# Patient Record
Sex: MALE | Race: WHITE | Employment: FULL TIME | URBAN - METROPOLITAN AREA
[De-identification: names, ages, dates, MRNs, and addresses within clinical notes are randomized per-mention and may not be internally consistent; named-entity substitution may affect disease eponyms.]

---

## 2020-11-17 PROBLEM — G89.29 CHRONIC THORACIC BACK PAIN: Status: ACTIVE | Noted: 2020-11-17

## 2020-11-17 PROBLEM — M54.6 CHRONIC THORACIC BACK PAIN: Status: ACTIVE | Noted: 2020-11-17

## 2020-11-17 PROBLEM — R79.89 LOW TESTOSTERONE IN MALE: Status: ACTIVE | Noted: 2020-11-17

## 2020-11-17 PROBLEM — R53.83 FATIGUE: Status: ACTIVE | Noted: 2020-11-17

## 2021-05-25 PROBLEM — J45.909 ALLERGIC BRONCHITIS: Status: ACTIVE | Noted: 2021-05-25

## 2021-05-25 PROBLEM — J45.909 ALLERGIC BRONCHITIS (HCC): Status: ACTIVE | Noted: 2021-05-25

## 2022-02-25 ENCOUNTER — LAB REQUISITION (OUTPATIENT)
Dept: SURGERY | Age: 42
End: 2022-02-25
Payer: COMMERCIAL

## 2022-02-26 LAB — SARS-COV-2 RNA RESP QL NAA+PROBE: NOT DETECTED

## 2022-02-28 ENCOUNTER — LAB REQUISITION (OUTPATIENT)
Dept: SURGERY | Age: 42
End: 2022-02-28
Payer: COMMERCIAL

## 2022-02-28 PROCEDURE — 88305 TISSUE EXAM BY PATHOLOGIST: CPT | Performed by: SURGERY

## 2025-05-31 ENCOUNTER — APPOINTMENT (OUTPATIENT)
Dept: CV DIAGNOSTICS | Facility: HOSPITAL | Age: 45
End: 2025-05-31
Attending: HOSPITALIST
Payer: COMMERCIAL

## 2025-05-31 ENCOUNTER — HOSPITAL ENCOUNTER (OUTPATIENT)
Facility: HOSPITAL | Age: 45
Setting detail: OBSERVATION
Discharge: HOME | End: 2025-06-02
Attending: EMERGENCY MEDICINE | Admitting: HOSPITALIST
Payer: COMMERCIAL

## 2025-05-31 ENCOUNTER — APPOINTMENT (OUTPATIENT)
Dept: CT IMAGING | Facility: HOSPITAL | Age: 45
End: 2025-05-31
Attending: EMERGENCY MEDICINE
Payer: COMMERCIAL

## 2025-05-31 ENCOUNTER — APPOINTMENT (OUTPATIENT)
Dept: GENERAL RADIOLOGY | Facility: HOSPITAL | Age: 45
End: 2025-05-31
Payer: COMMERCIAL

## 2025-05-31 DIAGNOSIS — R07.9 CHEST PAIN OF UNCERTAIN ETIOLOGY: Primary | ICD-10-CM

## 2025-05-31 PROBLEM — E16.2 HYPOGLYCEMIA: Status: ACTIVE | Noted: 2025-05-31

## 2025-05-31 LAB
ALBUMIN SERPL-MCNC: 4.8 G/DL (ref 3.2–4.8)
ALBUMIN/GLOB SERPL: 2 {RATIO} (ref 1–2)
ALP LIVER SERPL-CCNC: 59 U/L (ref 45–117)
ALT SERPL-CCNC: 27 U/L (ref 10–49)
ANION GAP SERPL CALC-SCNC: 7 MMOL/L (ref 0–18)
AST SERPL-CCNC: 35 U/L (ref ?–34)
ATRIAL RATE: 97 BPM
BASOPHILS # BLD AUTO: 0.08 X10(3) UL (ref 0–0.2)
BASOPHILS NFR BLD AUTO: 1.4 %
BILIRUB SERPL-MCNC: 0.8 MG/DL (ref 0.3–1.2)
BUN BLD-MCNC: 20 MG/DL (ref 9–23)
BUN/CREAT SERPL: 15.4 (ref 10–20)
CALCIUM BLD-MCNC: 9.9 MG/DL (ref 8.7–10.4)
CHLORIDE SERPL-SCNC: 107 MMOL/L (ref 98–112)
CO2 SERPL-SCNC: 28 MMOL/L (ref 21–32)
CREAT BLD-MCNC: 1.3 MG/DL (ref 0.7–1.3)
DEPRECATED RDW RBC AUTO: 42.1 FL (ref 35.1–46.3)
EGFRCR SERPLBLD CKD-EPI 2021: 69 ML/MIN/1.73M2 (ref 60–?)
EOSINOPHIL # BLD AUTO: 0.11 X10(3) UL (ref 0–0.7)
EOSINOPHIL NFR BLD AUTO: 1.9 %
ERYTHROCYTE [DISTWIDTH] IN BLOOD BY AUTOMATED COUNT: 13.2 % (ref 11–15)
GLOBULIN PLAS-MCNC: 2.4 G/DL (ref 2–3.5)
GLUCOSE BLD-MCNC: 61 MG/DL (ref 70–99)
GLUCOSE BLDC GLUCOMTR-MCNC: 102 MG/DL (ref 70–99)
HCT VFR BLD AUTO: 48.9 % (ref 39–53)
HGB BLD-MCNC: 16.5 G/DL (ref 13–17.5)
IMM GRANULOCYTES # BLD AUTO: 0.01 X10(3) UL (ref 0–1)
IMM GRANULOCYTES NFR BLD: 0.2 %
LYMPHOCYTES # BLD AUTO: 1.97 X10(3) UL (ref 1–4)
LYMPHOCYTES NFR BLD AUTO: 33.8 %
MCH RBC QN AUTO: 29.5 PG (ref 26–34)
MCHC RBC AUTO-ENTMCNC: 33.7 G/DL (ref 31–37)
MCV RBC AUTO: 87.3 FL (ref 80–100)
MONOCYTES # BLD AUTO: 0.36 X10(3) UL (ref 0.1–1)
MONOCYTES NFR BLD AUTO: 6.2 %
NEUTROPHILS # BLD AUTO: 3.29 X10 (3) UL (ref 1.5–7.7)
NEUTROPHILS # BLD AUTO: 3.29 X10(3) UL (ref 1.5–7.7)
NEUTROPHILS NFR BLD AUTO: 56.5 %
OSMOLALITY SERPL CALC.SUM OF ELEC: 295 MOSM/KG (ref 275–295)
P AXIS: 53 DEGREES
P-R INTERVAL: 146 MS
PLATELET # BLD AUTO: 254 10(3)UL (ref 150–450)
POTASSIUM SERPL-SCNC: 4.5 MMOL/L (ref 3.5–5.1)
PROT SERPL-MCNC: 7.2 G/DL (ref 5.7–8.2)
Q-T INTERVAL: 342 MS
QRS DURATION: 88 MS
QTC CALCULATION (BEZET): 434 MS
R AXIS: -6 DEGREES
RBC # BLD AUTO: 5.6 X10(6)UL (ref 4.3–5.7)
SODIUM SERPL-SCNC: 142 MMOL/L (ref 136–145)
T AXIS: 30 DEGREES
TROPONIN I SERPL HS-MCNC: 3 NG/L (ref ?–53)
TROPONIN I SERPL HS-MCNC: <3 NG/L (ref ?–53)
VENTRICULAR RATE: 97 BPM
WBC # BLD AUTO: 5.8 X10(3) UL (ref 4–11)

## 2025-05-31 PROCEDURE — 71045 X-RAY EXAM CHEST 1 VIEW: CPT

## 2025-05-31 PROCEDURE — 93306 TTE W/DOPPLER COMPLETE: CPT | Performed by: HOSPITALIST

## 2025-05-31 PROCEDURE — 71045 X-RAY EXAM CHEST 1 VIEW: CPT | Performed by: EMERGENCY MEDICINE

## 2025-05-31 PROCEDURE — 71260 CT THORAX DX C+: CPT | Performed by: EMERGENCY MEDICINE

## 2025-05-31 PROCEDURE — 99223 1ST HOSP IP/OBS HIGH 75: CPT | Performed by: HOSPITALIST

## 2025-05-31 RX ORDER — HYDROCODONE BITARTRATE AND ACETAMINOPHEN 5; 325 MG/1; MG/1
1 TABLET ORAL EVERY 4 HOURS PRN
Status: DISCONTINUED | OUTPATIENT
Start: 2025-05-31 | End: 2025-06-02

## 2025-05-31 RX ORDER — DULOXETIN HYDROCHLORIDE 30 MG/1
30 CAPSULE, DELAYED RELEASE ORAL DAILY
Status: DISCONTINUED | OUTPATIENT
Start: 2025-05-31 | End: 2025-06-02

## 2025-05-31 RX ORDER — PROCHLORPERAZINE EDISYLATE 5 MG/ML
5 INJECTION INTRAMUSCULAR; INTRAVENOUS EVERY 8 HOURS PRN
Status: DISCONTINUED | OUTPATIENT
Start: 2025-05-31 | End: 2025-06-02

## 2025-05-31 RX ORDER — HEPARIN SODIUM 5000 [USP'U]/ML
5000 INJECTION, SOLUTION INTRAVENOUS; SUBCUTANEOUS EVERY 12 HOURS SCHEDULED
Status: DISCONTINUED | OUTPATIENT
Start: 2025-05-31 | End: 2025-06-02

## 2025-05-31 RX ORDER — ONDANSETRON 2 MG/ML
4 INJECTION INTRAMUSCULAR; INTRAVENOUS EVERY 6 HOURS PRN
Status: DISCONTINUED | OUTPATIENT
Start: 2025-05-31 | End: 2025-06-02

## 2025-05-31 RX ORDER — ACETAMINOPHEN 325 MG/1
650 TABLET ORAL EVERY 4 HOURS PRN
Status: DISCONTINUED | OUTPATIENT
Start: 2025-05-31 | End: 2025-06-02

## 2025-05-31 RX ORDER — GABAPENTIN 600 MG/1
600 TABLET ORAL NIGHTLY
Status: DISCONTINUED | OUTPATIENT
Start: 2025-05-31 | End: 2025-06-02

## 2025-05-31 RX ORDER — SODIUM PHOSPHATE, DIBASIC AND SODIUM PHOSPHATE, MONOBASIC 7; 19 G/230ML; G/230ML
1 ENEMA RECTAL ONCE AS NEEDED
Status: DISCONTINUED | OUTPATIENT
Start: 2025-05-31 | End: 2025-06-02

## 2025-05-31 RX ORDER — POLYETHYLENE GLYCOL 3350 17 G/17G
17 POWDER, FOR SOLUTION ORAL DAILY PRN
Status: DISCONTINUED | OUTPATIENT
Start: 2025-05-31 | End: 2025-06-02

## 2025-05-31 RX ORDER — HYDROCODONE BITARTRATE AND ACETAMINOPHEN 5; 325 MG/1; MG/1
2 TABLET ORAL EVERY 4 HOURS PRN
Status: DISCONTINUED | OUTPATIENT
Start: 2025-05-31 | End: 2025-06-02

## 2025-05-31 RX ORDER — DULOXETIN HYDROCHLORIDE 30 MG/1
60 CAPSULE, DELAYED RELEASE ORAL DAILY
Status: DISCONTINUED | OUTPATIENT
Start: 2025-05-31 | End: 2025-06-02

## 2025-05-31 RX ORDER — BISACODYL 10 MG
10 SUPPOSITORY, RECTAL RECTAL
Status: DISCONTINUED | OUTPATIENT
Start: 2025-05-31 | End: 2025-06-02

## 2025-05-31 RX ORDER — SENNOSIDES 8.6 MG
17.2 TABLET ORAL NIGHTLY PRN
Status: DISCONTINUED | OUTPATIENT
Start: 2025-05-31 | End: 2025-06-02

## 2025-05-31 NOTE — ED PROVIDER NOTES
Patient Seen in: [facility] Emergency Department        History  Chief Complaint   Patient presents with    Chest Pain Angina    Difficulty Breathing     Stated Complaint: CP, sob    Subjective:   HPI            Patient presents emergency department with chest pain and shortness of breath.  He states for the last several days she has had significant exertional chest pain and shortness of breath which radiates to his left arm and left jaw with tingling in these areas.  He has a family history of heart disease and also a history of elevated cholesterol.  There is no fever or chills.  There is no vomiting or diarrhea.  There is no other aggravating relieving factors.      Objective:     Past Medical History:    Asthma (HCC)    Hyperlipidemia              Past Surgical History:   Procedure Laterality Date    Back surgery      Foot surgery      Knee replacement surgery                  Social History     Socioeconomic History    Marital status:    Tobacco Use    Smoking status: Never    Smokeless tobacco: Never   Vaping Use    Vaping status: Never Used   Substance and Sexual Activity    Alcohol use: Yes     Comment: Social    Drug use: Never                                Physical Exam    ED Triage Vitals [05/31/25 0932]   BP (!) 134/99   Pulse 84   Resp 22   Temp 97.1 °F (36.2 °C)   Temp src Temporal   SpO2 99 %   O2 Device None (Room air)       Current Vitals:   Vital Signs  BP: (!) 130/102  Pulse: 67  Resp: 18  Temp: 97.1 °F (36.2 °C)  Temp src: Temporal  MAP (mmHg): (!) 112    Oxygen Therapy  SpO2: 95 %  O2 Device: None (Room air)            Physical Exam  Vitals and nursing note reviewed.   Constitutional:       General: He is not in acute distress.     Appearance: He is well-developed.   HENT:      Head: Normocephalic.      Nose: Nose normal.      Mouth/Throat:      Mouth: Mucous membranes are moist.   Eyes:      Conjunctiva/sclera: Conjunctivae normal.   Cardiovascular:      Rate and Rhythm: Normal  rate and regular rhythm.      Heart sounds: No murmur heard.  Pulmonary:      Effort: Pulmonary effort is normal. No respiratory distress.      Breath sounds: Normal breath sounds.   Abdominal:      General: There is no distension.      Palpations: Abdomen is soft.      Tenderness: There is no abdominal tenderness.   Musculoskeletal:         General: No tenderness. Normal range of motion.      Cervical back: Normal range of motion and neck supple.   Skin:     General: Skin is warm and dry.      Capillary Refill: Capillary refill takes less than 2 seconds.      Findings: No rash.   Neurological:      General: No focal deficit present.      Mental Status: He is alert and oriented to person, place, and time.      Cranial Nerves: No cranial nerve deficit.      Motor: No weakness.                 ED Course  Labs Reviewed   COMP METABOLIC PANEL (14) - Abnormal; Notable for the following components:       Result Value    Glucose 61 (*)     AST 35 (*)     All other components within normal limits   POCT GLUCOSE - Abnormal; Notable for the following components:    POC Glucose  102 (*)     All other components within normal limits   TROPONIN I HIGH SENSITIVITY - Normal   TROPONIN I HIGH SENSITIVITY - Normal   CBC WITH DIFFERENTIAL WITH PLATELET   RAINBOW DRAW BLUE     EKG    Rate, intervals and axes as noted on EKG Report.  Rate: 97 bpm  Rhythm: Sinus Rhythm  Reading: Nonspecific changes, abnormal         EKG    Indication: chest pain  Rhythm: NSR  Comparison: No old EKG for Comparison  ST Segment: normal  Interpretation: normal ekg       Heart Score:    HEART Score      Title      Criteria Score   Age: 45-64 Age Score: 1   History: Mod Suspicious Hx Score: 1     EKG: Non-Spec Changes EKG Score: 1   HTN: No   Hypercholesterolemia: Yes   Atherosclerosis/PVD: No     DM: No   BMI>30kg/m2: No   Smoking: No   Family History: Yes         Other Risk Factor Score: 2             Lab Results   Component Value Date    TROPHS <3  05/31/2025           HEART Score: 4        Risk of adverse cardiac event is 12-16.6%                            MDM         Admission disposition: 5/31/2025 12:30 PM           Medical Decision Making  Differential diagnosis considered for PE, angina, non-STEMI.    Problems Addressed:  Chest pain of uncertain etiology: acute illness or injury    Amount and/or Complexity of Data Reviewed  Labs: ordered. Decision-making details documented in ED Course.     Details: Troponin x 2 negative.  CBC and chemistry panel unremarkable.  Radiology: ordered and independent interpretation performed. Decision-making details documented in ED Course.     Details: Chest x-ray and CT of chest for PE both normal.  ECG/medicine tests: ordered and independent interpretation performed. Decision-making details documented in ED Course.  Discussion of management or test interpretation with external provider(s): Patient has typical angina symptoms and continues to have intermittent chest pain.  Will admit for observation.    Risk  Decision regarding hospitalization.        Disposition and Plan     Clinical Impression:  1. Chest pain of uncertain etiology         Disposition:  Admit  5/31/2025 12:30 pm    Follow-up:  No follow-up provider specified.        Medications Prescribed:  Current Discharge Medication List                Supplementary Documentation:         Hospital Problems       Present on Admission  Date Reviewed: 5/31/2025          ICD-10-CM Noted POA    * (Principal) Chest pain of uncertain etiology R07.9 5/31/2025 Unknown    Hypoglycemia E16.2 5/31/2025 Yes

## 2025-05-31 NOTE — ED QUICK NOTES
Orders for admission, patient is aware of plan and ready to go upstairs. Any questions, please call ED RN Jessica at extension [phone].     Patient Covid vaccination status: Unvaccinated     COVID Test Ordered in ED: None    COVID Suspicion at Admission: N/A    Running Infusions:  see MAR     Mental Status/LOC at time of transport: A&Ox4/4     Other pertinent information:   CIWA score: N/A   NIH score:  N/A

## 2025-05-31 NOTE — PLAN OF CARE
Problem: Patient Centered Care  Goal: Patient preferences are identified and integrated in the patient's plan of care  Description: Interventions:  - What would you like us to know as we care for you?   Problem: Patient/Family Goals  Goal: Patient/Family Long Term Goal  Description: Patient's Long Term Goal:   I  Problem: CARDIOVASCULAR - ADULT  Goal: Maintains optimal cardiac output and hemodynamic stability  Description: INTERVENTIONS:  - Monitor vital signs, rhythm, and trends  - Monitor for bleeding, hypotension and signs of decreased cardiac output  - Evaluate effectiveness of vasoactive medications to optimize hemodynamic stability  - Monitor arterial and/or venous puncture sites for bleeding and/or hematoma  - Assess quality of pulses, skin color and temperature  - Assess for signs of decreased coronary artery perfusion - ex. Angina  - Evaluate fluid balance, assess for edema, trend weights  Outcome: Progressing  Goal: Absence of cardiac arrhythmias or at baseline  Description: INTERVENTIONS:  - Continuous cardiac monitoring, monitor vital signs, obtain 12 lead EKG if indicated  - Evaluate effectiveness of antiarrhythmic and heart rate control medications as ordered  - Initiate emergency measures for life threatening arrhythmias  - Monitor electrolytes and administer replacement therapy as ordered  Outcome: Progressing   nterventions:  - See additional Care Plan goals for specific interventions  Outcome: Progressing  Goal: Patient/Family Short Term Goal  Description: Patient's Short Term Goal:   Interventions:   - See additional Care Plan goals for specific interventions  Outcome: Progressing     - Provide timely, complete, and accurate information to patient/family  - Incorporate patient and family knowledge, values, beliefs, and cultural backgrounds into the planning and delivery of care  - Encourage patient/family to participate in care and decision-making at the level they choose  - [place] patient and  family perspectives and choices  Outcome: Progressing

## 2025-05-31 NOTE — ED INITIAL ASSESSMENT (HPI)
PT her for chest tightness and SOB that started about 0700. Left jaw feeling numbness. Dyspnea on exertion. Pt exercised this morning. SOB has been ongoing for a few weeks. Pt saw his primary yesterday and had an order placed for a spiral CT.  Pt recently traveled to [place] and took a 9 hour flight.

## 2025-06-01 LAB
ATRIAL RATE: 77 BPM
BASOPHILS # BLD AUTO: 0.08 X10(3) UL (ref 0–0.2)
BASOPHILS NFR BLD AUTO: 1.8 %
CHOLEST SERPL-MCNC: 204 MG/DL (ref ?–200)
DEPRECATED RDW RBC AUTO: 44.3 FL (ref 35.1–46.3)
EOSINOPHIL # BLD AUTO: 0.24 X10(3) UL (ref 0–0.7)
EOSINOPHIL NFR BLD AUTO: 5.3 %
ERYTHROCYTE [DISTWIDTH] IN BLOOD BY AUTOMATED COUNT: 13.2 % (ref 11–15)
HCT VFR BLD AUTO: 48.3 % (ref 39–53)
HDLC SERPL-MCNC: 54 MG/DL (ref 40–59)
HGB BLD-MCNC: 15.9 G/DL (ref 13–17.5)
IMM GRANULOCYTES # BLD AUTO: 0.01 X10(3) UL (ref 0–1)
IMM GRANULOCYTES NFR BLD: 0.2 %
LDLC SERPL CALC-MCNC: 128 MG/DL (ref ?–100)
LYMPHOCYTES # BLD AUTO: 1.73 X10(3) UL (ref 1–4)
LYMPHOCYTES NFR BLD AUTO: 38 %
MCH RBC QN AUTO: 30.1 PG (ref 26–34)
MCHC RBC AUTO-ENTMCNC: 32.9 G/DL (ref 31–37)
MCV RBC AUTO: 91.5 FL (ref 80–100)
MONOCYTES # BLD AUTO: 0.36 X10(3) UL (ref 0.1–1)
MONOCYTES NFR BLD AUTO: 7.9 %
NEUTROPHILS # BLD AUTO: 2.13 X10 (3) UL (ref 1.5–7.7)
NEUTROPHILS # BLD AUTO: 2.13 X10(3) UL (ref 1.5–7.7)
NEUTROPHILS NFR BLD AUTO: 46.8 %
NONHDLC SERPL-MCNC: 150 MG/DL (ref ?–130)
P AXIS: 38 DEGREES
P-R INTERVAL: 148 MS
PLATELET # BLD AUTO: 224 10(3)UL (ref 150–450)
Q-T INTERVAL: 356 MS
QRS DURATION: 88 MS
QTC CALCULATION (BEZET): 402 MS
R AXIS: -4 DEGREES
RBC # BLD AUTO: 5.28 X10(6)UL (ref 4.3–5.7)
T AXIS: 15 DEGREES
TRIGL SERPL-MCNC: 121 MG/DL (ref 30–149)
VENTRICULAR RATE: 77 BPM
VLDLC SERPL CALC-MCNC: 22 MG/DL (ref 0–30)
WBC # BLD AUTO: 4.6 X10(3) UL (ref 4–11)

## 2025-06-01 PROCEDURE — 99233 SBSQ HOSP IP/OBS HIGH 50: CPT | Performed by: FAMILY MEDICINE

## 2025-06-01 RX ORDER — METOPROLOL TARTRATE 50 MG
50 TABLET ORAL ONCE
Status: DISCONTINUED | OUTPATIENT
Start: 2025-06-02 | End: 2025-06-02

## 2025-06-01 RX ORDER — METOPROLOL TARTRATE 50 MG
50 TABLET ORAL ONCE AS NEEDED
Status: DISCONTINUED | OUTPATIENT
Start: 2025-06-02 | End: 2025-06-02

## 2025-06-01 RX ORDER — METOPROLOL TARTRATE 50 MG
50 TABLET ORAL ONCE AS NEEDED
Status: DISCONTINUED | OUTPATIENT
Start: 2025-06-01 | End: 2025-06-02

## 2025-06-01 RX ORDER — METOPROLOL TARTRATE 100 MG/1
100 TABLET ORAL ONCE AS NEEDED
Status: DISCONTINUED | OUTPATIENT
Start: 2025-06-02 | End: 2025-06-02

## 2025-06-01 RX ORDER — METOPROLOL TARTRATE 100 MG/1
100 TABLET ORAL ONCE AS NEEDED
Status: DISCONTINUED | OUTPATIENT
Start: 2025-06-01 | End: 2025-06-02

## 2025-06-01 RX ORDER — METOPROLOL TARTRATE 50 MG
50 TABLET ORAL ONCE AS NEEDED
Status: ACTIVE | OUTPATIENT
Start: 2025-06-01 | End: 2025-06-01

## 2025-06-01 RX ORDER — DILTIAZEM HYDROCHLORIDE 5 MG/ML
5 INJECTION INTRAVENOUS SEE ADMIN INSTRUCTIONS
Status: DISCONTINUED | OUTPATIENT
Start: 2025-06-01 | End: 2025-06-02

## 2025-06-01 RX ORDER — METOPROLOL TARTRATE 1 MG/ML
5 INJECTION, SOLUTION INTRAVENOUS SEE ADMIN INSTRUCTIONS
Status: DISCONTINUED | OUTPATIENT
Start: 2025-06-01 | End: 2025-06-02

## 2025-06-01 RX ORDER — NITROGLYCERIN 0.4 MG/1
0.4 TABLET SUBLINGUAL ONCE
Status: COMPLETED | OUTPATIENT
Start: 2025-06-01 | End: 2025-06-02

## 2025-06-01 RX ORDER — METOPROLOL TARTRATE 100 MG/1
100 TABLET ORAL ONCE
Status: DISCONTINUED | OUTPATIENT
Start: 2025-06-02 | End: 2025-06-02

## 2025-06-01 RX ORDER — METOPROLOL TARTRATE 100 MG/1
100 TABLET ORAL ONCE AS NEEDED
Status: ACTIVE | OUTPATIENT
Start: 2025-06-01 | End: 2025-06-01

## 2025-06-01 RX ORDER — METOPROLOL TARTRATE 100 MG/1
100 TABLET ORAL ONCE
Status: COMPLETED | OUTPATIENT
Start: 2025-06-01 | End: 2025-06-01

## 2025-06-01 RX ORDER — METOPROLOL TARTRATE 50 MG
50 TABLET ORAL ONCE
Status: COMPLETED | OUTPATIENT
Start: 2025-06-01 | End: 2025-06-01

## 2025-06-01 NOTE — H&P
[facility]    PATIENT'S NAME: RADHA SYKES   ATTENDING PHYSICIAN: Magalys Ramos MD   PATIENT ACCOUNT#:   [id number]    LOCATION:  [address] [place] [address]  MEDICAL RECORD #:   [id number]       YOB: 1980  ADMISSION DATE:       05/31/2025    HISTORY AND PHYSICAL EXAMINATION    CHIEF COMPLAINT:  Shortness of breath and chest tightness.    HISTORY OF PRESENT ILLNESS:  Patient is a 44-year-old male with past medical history of asthma and hyperlipidemia, who had come to the hospital endorsing several days of exertional chest pain and shortness of breath, which also radiates to his left arm and left jaw.  Patient does state that he does have a family history of heart disease and elevated cholesterol.  Denies any fevers or chills, headaches, blurred vision, palpitations, nausea, vomiting, diarrhea, headaches, or recent travel.    PAST MEDICAL HISTORY:  Positive for asthma and hyperlipidemia.    PAST SURGICAL HISTORY:  Positive for back surgery, foot surgery, knee replacement surgery.    MEDICATIONS:  Home medications have been reviewed and reconciled.  Please refer to the patient's chart for detailed review regarding the patient's home medications.    ALLERGIES:  NSAIDs.    SOCIAL HISTORY:  The patient is .  Negative for illicits, tobacco.  Has occasional alcohol.    REVIEW OF SYSTEMS:  Otherwise, a 10-point review of systems has been obtained and otherwise negative.      PHYSICAL EXAMINATION:    GENERAL:  Patient lying in bed, appears to be in no acute distress at this time.  He is A and O x3.  VITAL SIGNS:  The patient's blood pressure is 134/99, pulse 84, respirations 22, temperature 97.1, saturating 99% on room air.  HEENT:  Extraocular movements are intact.  Pupils equal, round, and reactive to light and accommodation.  Atraumatic, normocephalic.  LUNGS:  Good air entry upper airways.  HEART:  S1, S2 appreciated.  ABDOMEN:  Soft, nontender, nondistended.  Positive bowel  sounds.  EXTREMITIES:  Peripheral pulses are positive.  NEUROLOGIC:  No focal deficits noted at this time.    LABORATORY DATA:  Patient's glucose 61, sodium 142, potassium 4.5, chloride 107, carbon dioxide 28, BUN is 20, creatinine 1.3.  WBC 5.8, hemoglobin 16.5, hematocrit 40.9, platelets 254. Troponins  are less than 3 x2.    Patient's CT chest has been negative for any acute intrathoracic processes.      Patient underwent a chest x-ray which was also negative.    ASSESSMENT AND PLAN:  Patient is a 44-year-old male with past medical history of hypercholesterolemia and family history of heart disease, who had come to the hospital endorsing several days of exertional chest pressure and chest tightness.  1.     Exertional chest pain with left-sided radiation to the left arm and jaw.  Patient's troponins have been negative.  EKGs have been nonacute.  CTA of the chest has been negative as well as the chest x-ray.  At this time, we will continue to trend troponins.  We will continue to monitor closely.  2.   Patient does have a history of depression.  We will continue patient's home medications.  3.   VTE prophylaxis will be heparin subcutaneously 5000 units b.i.d.  4.   Disposition:  At this time, we will continue to monitor her closely.  Patient is a Full Code.  Further recommendations will follow.    Greater than 75 minutes spent, with greater than 50% of the time spent face-to-face.    Dictated By Magalys Ramos MD  d: 05/31/2025 14:18:42  t: 05/31/2025 18:03:20  Job [id number]  [facility]/

## 2025-06-01 NOTE — CONSULTS
HPI    Patient presents with chest tightness, shortness of breath with exertion for the past couple of months, particularly when walking upstairs. Recently experienced an episode while exercising on Saturday where he developed facial tingling, chest tightness, and significant dyspnea. These symptoms did not resolve with rest, prompting emergency department visit. Patient reports family history of cardiac disease. Primary care physician had planned outpatient CT angiogram which had not yet been scheduled.    Past medical history: Hyperlipidemia, Asthma  Allergies: None  Social history: Non-smoker, no alcohol use  Cardiac medications: None mentioned    ROS    Cardiovascular: Chest tightness with radiation to left arm and left jaw with tingling. Shortness of breath with exertion. Lightheadedness with exertion. No lower extremity edema. 12 point review of systems otherwise negative.    Physical Exam  Vitals: BP: 126/90 (previously 134/99 in ED) HR: Not mentioned  Gen: NAD HEENT: Oral mucosa moist. Lungs: CTA Heart: Normal rate. No murmur. Abdomen: Soft and non tender Extremities: No edema and warm to touch. Neuro: Alert and oriented. Psych: Appropriate mood. Calm affect. Skin: Normal perfusion. No rashes. Neck: Jvp not seen. Trachea midline.    Pertinent diagnostic findings: Troponin not elevated, , EKG showing sinus rhythm without ST segment elevation, CT chest negative for pulmonary embolism, Chest X-ray negative for acute abnormality, Creatinine 1.3    Assessment / Plan  1. Chest Pain with Exertional Dyspnea -  Patient presents with chest tightness, shortness of breath, and facial tingling during exertion. Given family history of cardiac disease and hyperlipidemia, plan for inpatient CT coronary angiogram tomorrow. Will start oral protocol tonight with morning protocol to follow. If abnormalities are detected, will proceed with invasive coronary angiogram.    I10 Angina pectoris, unspecified    2.  Hyperlipidemia -  LDL elevated at 128. Will address management after completion of cardiac workup.    E78.5 Hyperlipidemia, unspecified    3. Family History of Cardiac Disease -  Mother with myocardial infarction at age 71, grandfather, aunt, and uncle with cardiac history.    Z82.49 Family history of ischemic heart disease and other diseases of the circulatory system    4. Asthma -  No acute exacerbation noted.    J45.909 Unspecified asthma, uncomplicated    Discussion Notes    Discussed plan for inpatient CT coronary angiogram. Explained that if abnormalities are detected, we would proceed with invasive coronary angiogram. Patient verbalized understanding of the plan.    Thank you for the consult.

## 2025-06-01 NOTE — PLAN OF CARE
Pt alert and oriented x4. Self in room. Echo resulted. Plan for CTA tomorrow. Call light within reach. Safety precautions in place.  Problem: Patient Centered Care  Goal: Patient preferences are identified and integrated in the patient's plan of care  Description: Interventions:  - What would you like us to know as we care for you? From home with family  - Provide timely, complete, and accurate information to patient/family  - Incorporate patient and family knowledge, values, beliefs, and cultural backgrounds into the planning and delivery of care  - Encourage patient/family to participate in care and decision-making at the level they choose  - Honor patient and family perspectives and choices  Outcome: Progressing     Problem: Patient/Family Goals  Goal: Patient/Family Long Term Goal  Description: Patient's Long Term Goal: Discharge    Interventions:  - Follow MD orders  - See additional Care Plan goals for specific interventions  Outcome: Progressing  Goal: Patient/Family Short Term Goal  Description: Patient's Short Term Goal: No pain     Interventions:   - Pain management  - See additional Care Plan goals for specific interventions  Outcome: Progressing     Problem: CARDIOVASCULAR - ADULT  Goal: Maintains optimal cardiac output and hemodynamic stability  Description: INTERVENTIONS:  - Monitor vital signs, rhythm, and trends  - Monitor for bleeding, hypotension and signs of decreased cardiac output  - Evaluate effectiveness of vasoactive medications to optimize hemodynamic stability  - Monitor arterial and/or venous puncture sites for bleeding and/or hematoma  - Assess quality of pulses, skin color and temperature  - Assess for signs of decreased coronary artery perfusion - ex. Angina  - Evaluate fluid balance, assess for edema, trend weights  Outcome: Progressing  Goal: Absence of cardiac arrhythmias or at baseline  Description: INTERVENTIONS:  - Continuous cardiac monitoring, monitor vital signs, obtain 12 lead  EKG if indicated  - Evaluate effectiveness of antiarrhythmic and heart rate control medications as ordered  - Initiate emergency measures for life threatening arrhythmias  - Monitor electrolytes and administer replacement therapy as ordered  Outcome: Progressing

## 2025-06-01 NOTE — PLAN OF CARE
Pt is A&OX4; VSS; ambulatory; no significant issues; will continue to monitor  Problem: Patient Centered Care  Goal: Patient preferences are identified and integrated in the patient's plan of care  Description: Interventions:  - What would you like us to know as we care for you? I recently went to [place]  - Provide timely, complete, and accurate information to patient/family  - Incorporate patient and family knowledge, values, beliefs, and cultural backgrounds into the planning and delivery of care  - Encourage patient/family to participate in care and decision-making at the level they choose  - Honor patient and family perspectives and choices  Outcome: Progressing     Problem: Patient/Family Goals  Goal: Patient/Family Long Term Goal  Description: Patient's Long Term Goal: Find out why I am having so many occurences of chest pain    Interventions:  - Attend follow up appointments and diagnostics  - See additional Care Plan goals for specific interventions  Outcome: Progressing  Goal: Patient/Family Short Term Goal  Description: Patient's Short Term Goal: Go home    Interventions:   - Follow the appropriate plan given by my healthcare team  - See additional Care Plan goals for specific interventions  Outcome: Progressing     Problem: CARDIOVASCULAR - ADULT  Goal: Maintains optimal cardiac output and hemodynamic stability  Description: INTERVENTIONS:  - Monitor vital signs, rhythm, and trends  - Monitor for bleeding, hypotension and signs of decreased cardiac output  - Evaluate effectiveness of vasoactive medications to optimize hemodynamic stability  - Monitor arterial and/or venous puncture sites for bleeding and/or hematoma  - Assess quality of pulses, skin color and temperature  - Assess for signs of decreased coronary artery perfusion - ex. Angina  - Evaluate fluid balance, assess for edema, trend weights  Outcome: Progressing  Goal: Absence of cardiac arrhythmias or at baseline  Description: INTERVENTIONS:  -  Continuous cardiac monitoring, monitor vital signs, obtain 12 lead EKG if indicated  - Evaluate effectiveness of antiarrhythmic and heart rate control medications as ordered  - Initiate emergency measures for life threatening arrhythmias  - Monitor electrolytes and administer replacement therapy as ordered  Outcome: Progressing

## 2025-06-01 NOTE — PROGRESS NOTES
Progress Note     Terrence Alvarado Patient Status:  Observation    1980 MRN [id number]   Location [facility] 3W/SW Attending Tiffanie Villar MD   Hosp Day # 0 PCP ALEKSANDRA SCHMITT MD     Chief Complaint: patient presented with   Chief Complaint   Patient presents with    Chest Pain Angina    Difficulty Breathing       Subjective:   S: Patient denies any chest pain     Review of Systems:   10 point ROS completed and was negative, except for pertinent positive and negatives stated in subjective.    Objective:   Vital signs:  Temp:  [97.5 °F (36.4 °C)-98.1 °F (36.7 °C)] 98.1 °F (36.7 °C)  Pulse:  [52-75] 75  Resp:  [16-18] 18  BP: (121-127)/(88-93) 126/90  SpO2:  [95 %-100 %] 100 %    Wt Readings from Last 6 Encounters:   25 203 lb (92.1 kg)   25 210 lb (95.3 kg)   25 210 lb (95.3 kg)   24 201 lb (91.2 kg)   09/15/23 195 lb (88.5 kg)   23 198 lb (89.8 kg)         Physical Exam:    General: No acute distress. Alert ,         Respiratory: Clear to auscultation bilaterally. No wheezes. No rhonchi.  Cardiovascular: S1, S2. Regular rate and rhythm. No murmurs, rubs or gallops.   Abdomen: Soft, nontender, nondistended.  Positive bowel sounds. No rebound or guarding.  Neurologic: No focal neurological deficits.   Musculoskeletal: Moves all extremities.  Extremities: No edema.    Results:   Diagnostic Data:      Labs:    Labs Last 24 Hours:   BMP     CBC    Other     Na - Cl - BUN - Glu -   Hb 15.9   PTT - Procal -   K - CO2 - Cr -   WBC 4.6 >< .0  INR - CRP -   Renal Lytes Endo    Hct 48.3   Trop - D dim -   eGFR - Ca - POC Gluc  -    LFT   pBNP - Lactic -   eGFR AA - PO4 - A1c -   AST - APk - Prot -  LDL -     Mg - TSH -   ALT - T kartik - Alb -        COVID-19 Lab Results    COVID-19  Lab Results   Component Value Date    COVID19 Not Detected 2022       Pro-Calcitonin  No results for input(s): \"PCT\" in the last 168 hours.    Cardiac  No results for input(s): \"TROP\",  \"PBNP\" in the last 168 hours.    Creatinine Kinase  No results for input(s): \"CK\" in the last 168 hours.    Inflammatory Markers  No results for input(s): \"CRP\", \"JODY\", \"LDH\", \"DDIMER\" in the last 168 hours.    Imaging: Imaging data reviewed in Epic.    Medications: Scheduled Medications[1]    Assessment & Plan:   ASSESSMENT / PLAN:         Patient is a 44-year-old male with past medical history of hypercholesterolemia and family history of heart disease, who had come to the hospital endorsing several days of exertional chest pressure and chest tightness.    1.       Exertional chest pain with left-sided radiation to the left arm and jaw.    Patient's troponins have been negative.    EKGs have been nonacute.    CTA of the chest has been negative as well as the chest x-ray.   Cards consulted-Appreciate recs   Per cards: plan for inpatient CT coronary angiogram tomorrow. Will start oral protocol tonight with morning protocol to follow. If abnormalities are detected, will proceed with invasive coronary angiogram.   ECHO-EF 60-65%    2.       Patient does have a history of depression.    We will continue patient's home medications.    3.       VTE prophylaxis will be heparin subcutaneously 5000 units b.i.d.    4.       Disposition:  At this time, we will continue to monitor her closely.  Patient is a Full Code.  Further recommendations will follow.  Quality:  DVT Prophylaxis: Heparin   CODE status: FULL   DISPO: pending clinical improvement.   Estimated date of discharge: To be determined  Discharge is dependent on: Improved clinical status  At this point Patient is expected to be discharge to: Home versus rehab      Plan of care discussed with Patient and RN.     Coordinated care with providers and counseling re: treatment plan and workup     Tiffanie Villar MD    Supplementary Documentation:       I personally reviewed the available laboratories, imaging including operative report. I discussed/will discuss the case  with patient and her nurse. I ordered laboratories studies. I adjusted medications including not applicable today. Medical decision making high, risk is high.     >55min spent, >50% spent counseling and coordinating care in the form of educating pt/family and d/w consultants and staff. Most of the time spent discussing the above plan.               [1]    nitroglycerin  0.4 mg Sublingual Once    metoprolol  5 mg Intravenous See Admin Instructions    Or    dilTIAZem  5 mg Intravenous See Admin Instructions    metoprolol tartrate  50 mg Oral Once    Or    metoprolol tartrate  100 mg Oral Once    [START ON 6/2/2025] metoprolol tartrate  50 mg Oral Once    Or    [START ON 6/2/2025] metoprolol tartrate  100 mg Oral Once    DULoxetine  30 mg Oral Daily    DULoxetine  60 mg Oral Daily    gabapentin  600 mg Oral Nightly    heparin  5,000 Units Subcutaneous 2 times per day

## 2025-06-02 ENCOUNTER — APPOINTMENT (OUTPATIENT)
Dept: CT IMAGING | Facility: HOSPITAL | Age: 45
End: 2025-06-02
Attending: INTERNAL MEDICINE
Payer: COMMERCIAL

## 2025-06-02 VITALS
BODY MASS INDEX: 25.24 KG/M2 | SYSTOLIC BLOOD PRESSURE: 112 MMHG | DIASTOLIC BLOOD PRESSURE: 88 MMHG | OXYGEN SATURATION: 98 % | RESPIRATION RATE: 18 BRPM | TEMPERATURE: 98 F | HEART RATE: 53 BPM | WEIGHT: 203 LBS | HEIGHT: 75 IN

## 2025-06-02 LAB
CREAT BLD-MCNC: 1.17 MG/DL (ref 0.7–1.3)
EGFRCR SERPLBLD CKD-EPI 2021: 78 ML/MIN/1.73M2 (ref 60–?)

## 2025-06-02 PROCEDURE — 99239 HOSP IP/OBS DSCHRG MGMT >30: CPT | Performed by: FAMILY MEDICINE

## 2025-06-02 PROCEDURE — 75574 CT ANGIO HRT W/3D IMAGE: CPT | Performed by: INTERNAL MEDICINE

## 2025-06-02 RX ORDER — METOPROLOL TARTRATE 1 MG/ML
INJECTION, SOLUTION INTRAVENOUS
Status: COMPLETED
Start: 2025-06-02 | End: 2025-06-02

## 2025-06-02 RX ORDER — ATORVASTATIN CALCIUM 40 MG/1
40 TABLET, FILM COATED ORAL NIGHTLY
Status: DISCONTINUED | OUTPATIENT
Start: 2025-06-02 | End: 2025-06-02

## 2025-06-02 RX ORDER — ATORVASTATIN CALCIUM 40 MG/1
40 TABLET, FILM COATED ORAL NIGHTLY
Qty: 30 TABLET | Refills: 0 | Status: SHIPPED | OUTPATIENT
Start: 2025-06-02

## 2025-06-02 NOTE — PLAN OF CARE
Pt is A&OX4; VSS; having CTA tomorrow; no significant issues; will continue to monitor  Problem: Patient Centered Care  Goal: Patient preferences are identified and integrated in the patient's plan of care  Description: Interventions:  - What would you like us to know as we care for you? My mom had same S&S a year ago and that's why I knew something was wrong  - Provide timely, complete, and accurate information to patient/family  - Incorporate patient and family knowledge, values, beliefs, and cultural backgrounds into the planning and delivery of care  - Encourage patient/family to participate in care and decision-making at the level they choose  - Honor patient and family perspectives and choices  Outcome: Progressing     Problem: Patient/Family Goals  Goal: Patient/Family Long Term Goal  Description: Patient's Long Term Goal: No more chest pain    Interventions:  - Take notes and follow guidelines set by cardiologist  - See additional Care Plan goals for specific interventions  Outcome: Progressing  Goal: Patient/Family Short Term Goal  Description: Patient's Short Term Goal: Go home    Interventions:   - Have CTA tomorrow and follow recommendations set by my healthcare team to improve condition  - See additional Care Plan goals for specific interventions  Outcome: Progressing     Problem: CARDIOVASCULAR - ADULT  Goal: Maintains optimal cardiac output and hemodynamic stability  Description: INTERVENTIONS:  - Monitor vital signs, rhythm, and trends  - Monitor for bleeding, hypotension and signs of decreased cardiac output  - Evaluate effectiveness of vasoactive medications to optimize hemodynamic stability  - Monitor arterial and/or venous puncture sites for bleeding and/or hematoma  - Assess quality of pulses, skin color and temperature  - Assess for signs of decreased coronary artery perfusion - ex. Angina  - Evaluate fluid balance, assess for edema, trend weights  Outcome: Progressing  Goal: Absence of cardiac  arrhythmias or at baseline  Description: INTERVENTIONS:  - Continuous cardiac monitoring, monitor vital signs, obtain 12 lead EKG if indicated  - Evaluate effectiveness of antiarrhythmic and heart rate control medications as ordered  - Initiate emergency measures for life threatening arrhythmias  - Monitor electrolytes and administer replacement therapy as ordered  Outcome: Progressing

## 2025-06-02 NOTE — IMAGING NOTE
HX TAKEN: ADMITTED WITH CHEST PAIN    PT CONSENTED     BASELINE VITAL SIGNS: HR 54  /73 BMI 25.4    CTA ORDERED BY DR HASSAN     18 GAUGE IV PRESENT  GFR = 78   CREATINE = 1.17    TO CT TABLE @ 0815    CONNECT TO MONITOR  HR 54 /73      NITROGLYCERIN 0.4 MILLIGRAMS SUBLINGUAL GIVEN AT 0816     0825: HR 66 /7 METOPROLOL 5 MILLIGRAMS GIVEN IV PUSH      INJECTION STARTED AT 0828 HR 47 DURING SCAN PROCEDURE COMPLETE    POST SCAN HR 56 /70 AT 0832    TRANSPORT PENDING. REPORT CALLED TO RANJEET ANTON

## 2025-06-02 NOTE — DISCHARGE SUMMARY
[facility]  part of [facility]    Discharge Summary    Terrence Alvarado Patient Status:  Observation    1980 MRN [id number]   Location [facility] 3W/SW Attending Tiffanie Villar MD   Hosp Day # 0 PCP ALEKSANDRA SCHMITT MD     Date of Admission: 2025 Disposition: Final discharge disposition not confirmed     Date of Discharge: 2025    Admitting Diagnosis: Chest pain of uncertain etiology [R07.9]    Hospital Discharge Diagnoses:   Exertional chest pain with left-sided radiation to the left arm and jaw.   H/o depression         Lace+ Score: 53  59-90 High Risk  29-58 Medium Risk  0-28   Low Risk.    TCM Follow-Up Recommendation:  LACE 29-58: Moderate Risk of readmission after discharge from the hospital.      Problem List: Problem List[1]      Physical Exam:   General appearance: alert, appears stated age and cooperative  Pulmonary:  clear to auscultation bilaterally  Cardiovascular: S1, S2 normal, no murmur, click, rub or gallop, regular rate and rhythm  Abdominal: soft, non-tender; bowel sounds normal; no masses,  no organomegaly  Extremities: extremities normal, atraumatic, no cyanosis or edema  Psychiatric: calm      HPI per admitting : Patient is a 44-year-old male with past medical history of asthma and hyperlipidemia, who had come to the hospital endorsing several days of exertional chest pain and shortness of breath, which also radiates to his left arm and left jaw. Patient does state that he does have a family history of heart disease and elevated cholesterol. Denies any fevers or chills, headaches, blurred vision, palpitations, nausea, vomiting, diarrhea, headaches, or recent travel.     Hospital Course: Patient is a 44-year-old male with past medical history of hypercholesterolemia and family history of heart disease, who had come to the hospital endorsing several days of exertional chest pressure and chest tightness.    1.       Exertional chest pain with left-sided  radiation to the left arm and jaw.    Patient's troponins have been negative.    EKGs have been nonacute.    CTA of the chest has been negative as well as the chest x-ray.   Cards consulted-Appreciate recs   Per cards: plan for inpatient CT coronary angiogram tomorrow. Will start oral protocol tonight with morning protocol to follow. If abnormalities are detected, will proceed with invasive coronary angiogram.   ECHO-EF 60-65%  Pt cleared from Cards for discharge  Recommended Statin.       2.       Patient does have a history of depression.    We will continue patient's home medications.    Consultations: Cardiology     Procedures:     ECHO: Conclusions:     1. Left ventricle: The cavity size was normal. Wall thickness was normal.      Systolic function was normal. The estimated ejection fraction was 60-65%,      by single plane method of disks. No diagnostic evidence for regional wall      motion abnormalities. Left ventricular diastolic function parameters were      normal.   2. Left atrium: The left atrial volume was normal.   3. Ascending aorta: The ascending aorta was at the upper limits of normal      and 3.8cm diameter.   Impressions:  No previous study from [facility] was   available for comparison.       CTA gated Coronaries  CORONARY ARTERIES:  Dominance Left.  Origin with no anomalies.  Left main no stenosis or plaque.  Left anterior descending artery is normal size. There are 2 major diagonal branches.    Left anterior descending artery demonstrates no stenosis or plaque. Diagonal branches demonstrate no stenosis or plaque.    Circumflex is normal size. There are 3 major marginal branches.    Circumflex demonstrates no stenosis or plaque. Marginal branches demonstrate no stenosis or plaque.  Right coronary artery is normal size. Right coronary artery demonstrates no stenosis or plaque.  PDA demonstrates no stenosis or plaque.  Posterolateral branches demonstrate no stenosis or plaque.        Complications: see hospital course     Discharge Condition: Good    Discharge Medications:      Discharge Medications        START taking these medications        Instructions Prescription details   atorvastatin 40 MG Tabs  Commonly known as: Lipitor      Take 1 tablet (40 mg total) by mouth nightly.   Quantity: 30 tablet  Refills: 0            CHANGE how you take these medications        Instructions Prescription details   gabapentin 600 MG Tabs  Commonly known as: Neurontin  What changed: when to take this      Take 1 tablet (600 mg total) by mouth daily.   Quantity: 90 tablet  Refills: 3            CONTINUE taking these medications        Instructions Prescription details   DULoxetine 60 MG Cpep  Commonly known as: Cymbalta      Take 1 capsule (60 mg total) by mouth daily.   Quantity: 90 capsule  Refills: 3     DULoxetine 30 MG Cpep  Commonly known as: Cymbalta      Take 1 capsule (30 mg total) by mouth daily.   Quantity: 90 capsule  Refills: 3               Where to Get Your Medications        These medications were sent to [facility]/pharmacy #[id number] - [facility] [address] [facility], [phone], [phone] [address] [facility] [address]      Phone: [phone]   atorvastatin 40 MG Tabs         Follow up Visits: Follow-up with PCP and Cards in 1 week        Tiffanie Villar MD  6/2/2025  1:01 PM    > 35 min        [1]   Patient Active Problem List  Diagnosis    Chronic thoracic back pain    Fatigue    Low testosterone in male    Allergic bronchitis (HCC)    Laboratory exam ordered as part of routine general medical examination    Hypoglycemia    Chest pain of uncertain etiology

## 2025-06-02 NOTE — PROGRESS NOTES
[facility]  part of [facility]    Cardiology Hospital Progress Note       Terrence Alvarado Patient Status:  Observation    1980 MRN [id number]   Location [facility] 3W/SW Attending Tiffanie Villar MD   Hosp Day # 0 PCP ALEKSANDRA SCHMITT MD       Patient is a 44 year old male who was admitted to the hospital for Chest pain of uncertain etiology:  Subjective:  No active chest pains    Past Medical History:   Past Medical History[1]    Past Surgical History:  Past Surgical History[2]    Family History:  Family History[3]    Social History:  Pediatric History   Patient Parents    Not on file     Other Topics Concern    Caffeine Concern Not Asked    Exercise Not Asked    Seat Belt Not Asked    Special Diet Not Asked    Stress Concern Not Asked    Weight Concern Not Asked   Social History Narrative    Not on file           Current Medications:  Current Hospital Medications[4]  Prescriptions Prior to Admission[5]    Allergies:  Allergies[6]    Review of Systems:   A comprehensive 12 point review of system was performed.  All pertinent positive and negative findings per HPI.    Physical Exam:   Blood pressure 112/88, pulse 53, temperature 97.9 °F (36.6 °C), temperature source Oral, resp. rate 18, height 6' 3\" (1.905 m), weight 203 lb (92.1 kg), SpO2 98%.  Intake/Output:   Last 3 shifts: [id number]   This shift: No intake/output data recorded.     Vent Settings:      Hemodynamic parameters (last 24 hours):      Scheduled Meds: Scheduled Medications[7]    Continuous Infusions: Medication Infusions[8]      Physical Exam:    General: NAD  HEENT: NAD  Neck: No JVD, no bruits.  Cardiac: Normal rate, No murmur.  Lungs: Clear without rhales, rhochi or wheezing.  Abdomen: Soft, non-tender.BS+  Extremities: No edema.    Neurologic: Alert and moving all 4 extremities.  Psychiatry: Patient has calm affect.      Results:     Laboratory Data:  Lab Results   Component Value Date    WBC 4.6  06/01/2025    HGB 15.9 06/01/2025    HCT 48.3 06/01/2025    .0 06/01/2025    CREATSERUM 1.17 06/01/2025    BUN 20 05/31/2025     05/31/2025    K 4.5 05/31/2025     05/31/2025    CO2 28.0 05/31/2025    GLU 61 (L) 05/31/2025    CA 9.9 05/31/2025    ALB 4.8 05/31/2025    ALKPHO 59 05/31/2025    TP 7.2 05/31/2025    AST 35 (H) 05/31/2025    ALT 27 05/31/2025    T4F 0.9 11/18/2020    TSH 0.69 11/18/2020         Imaging:  Narrative & Impression   PROCEDURE:                         CTA GATED CORONARY ARTERIES NO CALCIUM SCORING (CPT=75574)     DATE:                                        6/2/25     COMPARISON:                                                                  TELEMETRY TRACING SCAN 06/02/2025   Final         INDICATIONS:                        None     TECHNIQUE: The patient was placed supine on the multidetector CT table. Axial sections were obtained before and after administration of intravenous contrast. IV contrast was used for the study. Images were analyzed and reconstructed in axial, 2 and 3-dimensional format. Images are performed on a narrow field of view to maximize vascular imaging. See the Radiologist over-read for evaluation of non-vascular structures. Dose reduction techniques were used. Dose information is transmitted to the ACR (American College of Radiology) NRDR (National Radiology Data Registry) which includes the Dose Index Registry.     STUDY QUALITY:                   Excellent with vessels visualized to the crux of the heart     LIMITATIONS:                         None     EXAM FORM:  CT Scanner:                              [place] [employer] One 320 Slice Scanner   Contrast Given:                        Isovue 370  Contrast Amount (cc):              70   Dose (msv):                               2.461   BMI:                                            25  Heart Rate (bpm):                    47      Medication Used:                     Nitroglycerin Sublingual (mg):  0.4                                                       Metoprolol IV (mg):                                                       Metoprolol Oral (mg): 5                                                       Diltiazem IV (mg):                                                       Diltiazem Oral (mg):                                                       Cardizem IV (mg):                                                       Cardizem Oral (mg):                                                       Corlanor Oral (mg):                                                       Xanax Oral (mg):       Region with Calcium Score:Left Main:                                                       Left Anterior Descending:                                                       Circumflex:                                                        Right Coronary Artery:                                                       TOTAL CALCIUM SCORE:       FINDINGS:  LEFT VENTRICLE:                  Chamber size is normal. Wall thickness is normal. Myocardium is normal.  LEFT ATRIUM:                         Chamber size is normal. Attachment is normal. The left arterial appendage is well seen. There is no atrial or appendage thrombus.  INTRA ATRIAL SEPTUM:Normal appearance  RIGHT ATRIAL:                        Chamber size is normal. Appearance is normal.  RIGHT VENTRICLE:                Chamber size is normal. Appearance is normal.  INTRA VENTR SEPTUM:       Normal appearance.   AORTIC VALVE:                      Trileaflet  MITRAL VALVE:                      Normal appearance.  PERICARDIUM:                       Normal appearance.  PULMONARY ARTERIES:Inadequately visualized due to contrast bolus timing.  PULMONARY VEINS:             Four pulmonary veins return normally to left atrium.    AORTA:                                     Please see report by radiologist.     CORONARY ARTERIES:  Dominance Left.  Origin with no anomalies.  Left main  no stenosis or plaque.  Left anterior descending artery is normal size. There are 2 major diagonal branches.    Left anterior descending artery demonstrates no stenosis or plaque. Diagonal branches demonstrate no stenosis or plaque.    Circumflex is normal size. There are 3 major marginal branches.    Circumflex demonstrates no stenosis or plaque. Marginal branches demonstrate no stenosis or plaque.  Right coronary artery is normal size. Right coronary artery demonstrates no stenosis or plaque.  PDA demonstrates no stenosis or plaque.  Posterolateral branches demonstrate no stenosis or plaque.             CONCLUSION: Normal CT coronary angiogram.        Chest CT:  FINDINGS:     CARDIAC: Please see the cardiologist report for further details.   VASCULATURE: The visualized portions of the thoracic aorta are grossly unremarkable in appearance.    LUNGS/PLEURA: No airspace consolidation, pleural effusion, or pneumothorax is detected in the imaged region.    AIRWAYS: The distal airways appear grossly unremarkable.   MEDIASTINUM/JN: No mass or lymphadenopathy within the imaged volume.    CHEST WALL: No gross abnormalities are detected in the imaged volume.    LIMITED ABDOMEN: Within the parameters of the phase of contrast-enhancement, the included portion of the upper abdomen is unremarkable.    BONES: No bony lesion or fracture is seen.    OTHER: Negative.        IMPRESSION:  1. Chest Pain with Exertional Dyspnea -  Patient presents with chest tightness, shortness of breath, and facial tingling during exertion. Given family history of cardiac disease and hyperlipidemia, plan for inpatient CT coronary angiogram tomorrow. Will start oral protocol tonight with morning protocol to follow. If abnormalities are detected, will proceed with invasive coronary angiogram.     I10 Angina pectoris, unspecified     2. Hyperlipidemia -  LDL elevated at 128. Will address management after completion of cardiac workup.     E78.5  Hyperlipidemia, unspecified     3. Family History of Cardiac Disease -  Mother with myocardial infarction at age 71, grandfather, aunt, and uncle with cardiac history.     Z82.49 Family history of ischemic heart disease and other diseases of the circulatory system     4. Asthma -  No acute exacerbation noted.     J45.909 Unspecified asthma, uncomplicated    RECOMMENDATIONS:  Stable from cardiac standpoint  Coronary CTA as noted: Normal  Added Statin  OK to discharge from cardiac standpoint      D/W patient and nursing staff    Please do not hesitate to call with questions.    Avi Montanez MD  [facility]  [address]  [place] [address]  [phone]    This note was prepared using Dragon Medical voice recognition dictation software. As a result errors may occur. When identified these errors have been corrected. While every attempt is made to correct errors during dictation discrepancies may still exist        [1]   Past Medical History:   Asthma (HCC)    Hyperlipidemia   [2]   Past Surgical History:  Procedure Laterality Date    Back surgery      Foot surgery      Knee replacement surgery     [3] No family history on file.  [4]   Current Facility-Administered Medications   Medication Dose Route Frequency    DULoxetine (Cymbalta) DR cap 30 mg  30 mg Oral Daily    DULoxetine (Cymbalta) DR cap 60 mg  60 mg Oral Daily    gabapentin (Neurontin) tab 600 mg  600 mg Oral Nightly    heparin (Porcine) 5000 UNIT/ML injection 5,000 Units  5,000 Units Subcutaneous 2 times per day    acetaminophen (Tylenol) tab 650 mg  650 mg Oral Q4H PRN    Or    HYDROcodone-acetaminophen (Norco) 5-325 MG per tab 1 tablet  1 tablet Oral Q4H PRN    Or    HYDROcodone-acetaminophen (Norco) 5-325 MG per tab 2 tablet  2 tablet Oral Q4H PRN    polyethylene glycol (PEG 3350) (Miralax) 17 g oral packet 17 g  17 g Oral Daily PRN    sennosides (Senokot) tab 17.2 mg  17.2 mg Oral Nightly PRN    bisacodyl (Dulcolax) 10 MG  rectal suppository 10 mg  10 mg Rectal Daily PRN    fleet enema (Fleet) rectal enema 133 mL  1 enema Rectal Once PRN    ondansetron (Zofran) 4 MG/2ML injection 4 mg  4 mg Intravenous Q6H PRN    prochlorperazine (Compazine) 10 MG/2ML injection 5 mg  5 mg Intravenous Q8H PRN   [5]   Medications Prior to Admission   Medication Sig    DULoxetine 60 MG Oral Cap DR Particles Take 1 capsule (60 mg total) by mouth daily.    DULoxetine 30 MG Oral Cap DR Particles Take 1 capsule (30 mg total) by mouth daily.    gabapentin 600 MG Oral Tab Take 1 tablet (600 mg total) by mouth daily. (Patient taking differently: Take 1 tablet (600 mg total) by mouth at bedtime.)   [6]   Allergies  Allergen Reactions    Nsaids RASH     states can't take because on Cymbalta   [7]    DULoxetine  30 mg Oral Daily    DULoxetine  60 mg Oral Daily    gabapentin  600 mg Oral Nightly    heparin  5,000 Units Subcutaneous 2 times per day   [8]

## 2025-06-02 NOTE — PLAN OF CARE
Patient alert and oriented x 4. Vitals stable on room air. Patient denies pain. Patient cleared for discharge home with outpatient follow-up. AVS completed and discussed with patient, there were no further questions.    Problem: Patient Centered Care  Goal: Patient preferences are identified and integrated in the patient's plan of care  Description: Interventions:  - What would you like us to know as we care for you? From home with family  - Provide timely, complete, and accurate information to patient/family  - Incorporate patient and family knowledge, values, beliefs, and cultural backgrounds into the planning and delivery of care  - Encourage patient/family to participate in care and decision-making at the level they choose  - Honor patient and family perspectives and choices  Outcome: Adequate for Discharge     Problem: Patient/Family Goals  Goal: Patient/Family Long Term Goal  Description: Patient's Long Term Goal: Be able to discharge    Interventions:  - CT angiogram  - See additional Care Plan goals for specific interventions  Outcome: Adequate for Discharge  Goal: Patient/Family Short Term Goal  Description: Patient's Short Term Goal: Resolve chest pain    Interventions:   - Medication administration  - See additional Care Plan goals for specific interventions  Outcome: Adequate for Discharge     Problem: CARDIOVASCULAR - ADULT  Goal: Maintains optimal cardiac output and hemodynamic stability  Description: INTERVENTIONS:  - Monitor vital signs, rhythm, and trends  - Monitor for bleeding, hypotension and signs of decreased cardiac output  - Evaluate effectiveness of vasoactive medications to optimize hemodynamic stability  - Monitor arterial and/or venous puncture sites for bleeding and/or hematoma  - Assess quality of pulses, skin color and temperature  - Assess for signs of decreased coronary artery perfusion - ex. Angina  - Evaluate fluid balance, assess for edema, trend weights  Outcome: Adequate for  Discharge  Goal: Absence of cardiac arrhythmias or at baseline  Description: INTERVENTIONS:  - Continuous cardiac monitoring, monitor vital signs, obtain 12 lead EKG if indicated  - Evaluate effectiveness of antiarrhythmic and heart rate control medications as ordered  - Initiate emergency measures for life threatening arrhythmias  - Monitor electrolytes and administer replacement therapy as ordered  Outcome: Adequate for Discharge

## (undated) DIAGNOSIS — Z12.11 SPECIAL SCREENING FOR MALIGNANT NEOPLASMS, COLON: ICD-10-CM

## (undated) DIAGNOSIS — K92.1 MELENA: ICD-10-CM

## (undated) DIAGNOSIS — Z01.818 PREOP EXAMINATION: ICD-10-CM

## (undated) NOTE — LETTER
[place] [address]  Authorization for Invasive Procedures  Date: 6/1/2025           Time: 1313    I hereby authorize Dr. Hutton, my physician and his/her assistants (if applicable), which may include medical students, residents, and/or fellows, to perform the following surgical operation/ procedure and administer such anesthesia as may be determined necessary by my physician: computerized tomography coronary angiogram on Terrence Alvarado  2.   I recognize that during the surgical operation/procedure, unforeseen conditions may necessitate additional or different procedures than those listed above.  I, therefore, further authorize and request that the above-named surgeon, assistants, or designees perform such procedures as are, in their judgment, necessary and desirable.    3.   My surgeon/physician has discussed prior to my surgery the potential benefits, risks and side effects of this procedure; the likelihood of achieving goals; and potential problems that might occur during recuperation.  They also discussed reasonable alternatives to the procedure, including risks, benefits, and side effects related to the alternatives and risks related to not receiving this procedure.  I have had all my questions answered and I acknowledge that no guarantee has been made as to the result that may be obtained.    4.   Should the need arise during my operation/procedure, which includes change of level of care prior to discharge, I also consent to the administration of blood and/or blood products.  Further, I understand that despite careful testing and screening of blood or blood products by collecting agencies, I may still be subject to ill effects as a result of receiving a blood transfusion and/or blood products.  The following are some, but not all, of the potential risks that can occur: fever and allergic reactions, hemolytic reactions, transmission of diseases such as Hepatitis, AIDS and  Cytomegalovirus (CMV) and fluid overload.  In the event that I wish to have an autologous transfusion of my own blood, or a directed donor transfusion, I will discuss this with my physician.   Check only if Refusing Blood or Blood Products  I understand refusal of blood or blood products as deemed necessary by my physician may have serious consequences to my condition to include possible death. I hereby assume responsibility for my refusal and release the hospital, its personnel, and my physicians from any responsibility for the consequences of my refusal.         o  Refuse         5.   I authorize the use of any specimen, organs, tissues, body parts or foreign objects that may be removed from my body during the operation/procedure for diagnosis, research or teaching purposes and their subsequent disposal by hospital authorities.  I also authorize the release of specimen test results and/or written reports to my treating physician on the hospital medical staff or other referring or consulting physicians involved in my care, at the discretion of the Pathologist or my treating physician.    6.   I consent to the photographing or videotaping of the operations or procedures to be performed, including appropriate portions of my body for medical, scientific, or educational purposes, provided my identity is not revealed by the pictures or by descriptive texts accompanying them.  If the procedure has been photographed/videotaped, the surgeon will obtain the original picture, image, videotape or CD.  The hospital will not be responsible for storage, release or maintenance of the picture, image, tape or CD.    7.   I consent to the presence of a  or observers in the operating room as deemed necessary by my physician or their designees.    8.   I recognize that in the event my procedure results in extended X-Ray/fluoroscopy time, I may develop a skin reaction.    9. If I have a Do Not Attempt Resuscitation  (DNAR) order in place, that status will be suspended while in the operating room, procedural suite, and during the recovery period unless otherwise explicitly stated by me (or a person authorized to consent on my behalf). The surgeon or my attending physician will determine when the applicable recovery period ends for purposes of reinstating the DNAR order.  10. Patients having a sterilization procedure: I understand that if the procedure is successful the results will be permanent and it will therefore be impossible for me to inseminate, conceive, or bear children.  I also understand that the procedure is intended to result in sterility, although the result has not been guaranteed.   11. I acknowledge that my physician has explained sedation/analgesia administration to me including the risk and benefits I consent to the administration of sedation/analgesia as may be necessary or desirable in the judgment of my physician.    I CERTIFY THAT I HAVE READ AND FULLY UNDERSTAND THE ABOVE CONSENT TO OPERATION and/or OTHER PROCEDURE.        ____________________________________       _________________________________      ______________________________  Signature of Patient         Signature of Responsible Person        Printed Name of Responsible Person        ____________________________________      _________________________________      ______________________________       Signature of Witness          Relationship to Patient                       Date                                       Time  Patient Name: Terrence Alvarado  : 1980    Reviewed: 2024   Printed: 2025  Medical Record #: [id number] Page 1 of 2             STATEMENT OF PHYSICIAN My signature below affirms that prior to the time of the procedure; I have explained to the patient and/or his/her legal representative, the risks and benefits involved in the proposed treatment and any reasonable alternative to the proposed treatment. I have  also explained the risks and benefits involved in refusal of the proposed treatment and alternatives to the proposed treatment and have answered the patient's questions. If I have a significant financial interest in a co-management agreement or a significant financial interest in any product or implant, or other significant relationship used in this procedure/surgery, I have disclosed this and had a discussion with my patient.     _______________________________________________________________ _____________________________  (Signature of Physician)                                                                                         (Date)                                   (Time)  Patient Name: Terrence TERAN Alvarado  : 1980    Reviewed: 2024   Printed: 2025  Medical Record #: [id number] Page 2 of 2

## (undated) NOTE — LETTER
Consent for Coronary CT Angiography     on Terrence Alvarado    Your doctor has recommended that you have a Coronary CT Angiography procedure. Coronary CT Angiography is a diagnostic procedure using computed tomography to scan the chest and coronary arteries. It is a non-invasive technique that allows clear visualization of narrowed and blocked arteries. Blockage in these arteries may lead to heart attack or stroke.    You will lie on a table that slides slowly into a large circular opening called the CT gantry. The procedure will be performed by the CT Staff, including a nurse, a technologist, and a patient assistant. The staff will be with you throughout the entire procedure to explain each step, make you as comfortable as possible, and answer all of your questions. The staff will take a series of images of your heart and chest to help define the area to be imaged. You will be asked to hold your breath for a short time as each series of images is performed and acquired.     You may receive medication called a beta blocker that will slow your hear rate down. This is needed so we can obtain better images of your heart. You may also receive a nitroglycerine spray under your tongue. This medication is given to dilate the arteries for better picture quality. The nitroglycerine may cause a drop in blood pressure. During the procedure you will receive an injection of a contrast material, which assists the physician in highlighting the anatomy of your heart. You may experience a warm sensation through your body and a metallic taste in your mouth. In rare circumstances, a rash and/or hives may occur. It's very important to inform your care giver of any changes you may feel or experience.     The medication and the contrast material used as part of your procedure are all deemed very safe, however there is always an element of risk to a patient when taking medication. Allergic reactions to medication can range from very  minor to very serious, leading to a life threatening situation or even death. Please be sure to communicate any allergy you may have to your caregiver immediately.    The information that is obtained during your testing will be treated as privileged and confidential. The information obtained will be given to your doctor and may be used for insurance purposes or to track and trend data as part of  with your privacy retained.     I, Terrence Alvarado, have read and understand the above information. I voluntarily agree and consent to have the Coronary CT Angiography (CTA) Exam. Furthermore, no guarantees or assurances have been given by anyone as to the results that may be obtained from this procedure. Any questions that may have occurred to me have been answered to my satisfaction.    Signature of Patient: __________________________Date: ___________Time: _______      Patient Name: Terrence Alvarado    : 1980      Printed: 2025      Medical Record #: [id number]

## (undated) NOTE — LETTER
[place] [address]  Authorization for Invasive Procedures  Date: 6/1/2025           Time: 1306    I hereby authorize Dr. Hutton , my physician and his/her assistants (if applicable), which may include medical students, residents, and/or fellows, to perform the following surgical operation/ procedure and administer such anesthesia as may be determined necessary by my physician: computerized tomography coronary angiogramon Terrence Alvarado  2.   I recognize that during the surgical operation/procedure, unforeseen conditions may necessitate additional or different procedures than those listed above.  I, therefore, further authorize and request that the above-named surgeon, assistants, or designees perform such procedures as are, in their judgment, necessary and desirable.    3.   My surgeon/physician has discussed prior to my surgery the potential benefits, risks and side effects of this procedure; the likelihood of achieving goals; and potential problems that might occur during recuperation.  They also discussed reasonable alternatives to the procedure, including risks, benefits, and side effects related to the alternatives and risks related to not receiving this procedure.  I have had all my questions answered and I acknowledge that no guarantee has been made as to the result that may be obtained.    4.   Should the need arise during my operation/procedure, which includes change of level of care prior to discharge, I also consent to the administration of blood and/or blood products.  Further, I understand that despite careful testing and screening of blood or blood products by collecting agencies, I may still be subject to ill effects as a result of receiving a blood transfusion and/or blood products.  The following are some, but not all, of the potential risks that can occur: fever and allergic reactions, hemolytic reactions, transmission of diseases such as Hepatitis, AIDS and  Cytomegalovirus (CMV) and fluid overload.  In the event that I wish to have an autologous transfusion of my own blood, or a directed donor transfusion, I will discuss this with my physician.   Check only if Refusing Blood or Blood Products  I understand refusal of blood or blood products as deemed necessary by my physician may have serious consequences to my condition to include possible death. I hereby assume responsibility for my refusal and release the hospital, its personnel, and my physicians from any responsibility for the consequences of my refusal.         o  Refuse         5.   I authorize the use of any specimen, organs, tissues, body parts or foreign objects that may be removed from my body during the operation/procedure for diagnosis, research or teaching purposes and their subsequent disposal by hospital authorities.  I also authorize the release of specimen test results and/or written reports to my treating physician on the hospital medical staff or other referring or consulting physicians involved in my care, at the discretion of the Pathologist or my treating physician.    6.   I consent to the photographing or videotaping of the operations or procedures to be performed, including appropriate portions of my body for medical, scientific, or educational purposes, provided my identity is not revealed by the pictures or by descriptive texts accompanying them.  If the procedure has been photographed/videotaped, the surgeon will obtain the original picture, image, videotape or CD.  The hospital will not be responsible for storage, release or maintenance of the picture, image, tape or CD.    7.   I consent to the presence of a  or observers in the operating room as deemed necessary by my physician or their designees.    8.   I recognize that in the event my procedure results in extended X-Ray/fluoroscopy time, I may develop a skin reaction.    9. If I have a Do Not Attempt Resuscitation  (DNAR) order in place, that status will be suspended while in the operating room, procedural suite, and during the recovery period unless otherwise explicitly stated by me (or a person authorized to consent on my behalf). The surgeon or my attending physician will determine when the applicable recovery period ends for purposes of reinstating the DNAR order.  10. Patients having a sterilization procedure: I understand that if the procedure is successful the results will be permanent and it will therefore be impossible for me to inseminate, conceive, or bear children.  I also understand that the procedure is intended to result in sterility, although the result has not been guaranteed.   11. I acknowledge that my physician has explained sedation/analgesia administration to me including the risk and benefits I consent to the administration of sedation/analgesia as may be necessary or desirable in the judgment of my physician.    I CERTIFY THAT I HAVE READ AND FULLY UNDERSTAND THE ABOVE CONSENT TO OPERATION and/or OTHER PROCEDURE.        ____________________________________       _________________________________      ______________________________  Signature of Patient         Signature of Responsible Person        Printed Name of Responsible Person        ____________________________________      _________________________________      ______________________________       Signature of Witness          Relationship to Patient                       Date                                       Time  Patient Name: Terrence Alvarado  : 1980    Reviewed: 2024   Printed: 2025  Medical Record #: [id number] Page 1 of 2             STATEMENT OF PHYSICIAN My signature below affirms that prior to the time of the procedure; I have explained to the patient and/or his/her legal representative, the risks and benefits involved in the proposed treatment and any reasonable alternative to the proposed treatment. I have  also explained the risks and benefits involved in refusal of the proposed treatment and alternatives to the proposed treatment and have answered the patient's questions. If I have a significant financial interest in a co-management agreement or a significant financial interest in any product or implant, or other significant relationship used in this procedure/surgery, I have disclosed this and had a discussion with my patient.     _______________________________________________________________ _____________________________  (Signature of Physician)                                                                                         (Date)                                   (Time)  Patient Name: Terrence TERAN Alvarado  : 1980    Reviewed: 2024   Printed: 2025  Medical Record #: [id number] Page 2 of 2

## (undated) NOTE — LETTER
Hospital Discharge Documentation    From: [facility] Hospitalist's Office  Phone: [phone]    Patient discharged time/date: 2025  1:26 PM  Patient discharge disposition:  Home or Self Care       Discharge Summary - D/C Summary        Discharge Summary signed by Tiffanie Villar MD at 2025  1:06 PM  Version 1 of 1      Author: Tiffanie Villar MD Service: Hospitalist Author Type: Physician    Filed: 2025  1:06 PM Date of Service: 2025  1:01 PM Status: Signed    : Tiffanie Villar MD (Physician)         [facility]  part of [facility]    Discharge Summary    Terrence Alvarado Patient Status:  Observation    1980 MRN [id number]   Location [facility] 3W/SW Attending Tiffanie Villar MD   Hosp Day # 0 PCP ALEKSANDRA SCHMITT MD     Date of Admission: 2025 Disposition: Final discharge disposition not confirmed     Date of Discharge: 2025    Admitting Diagnosis: Chest pain of uncertain etiology [R07.9]    Hospital Discharge Diagnoses:   Exertional chest pain with left-sided radiation to the left arm and jaw.   H/o depression         Lace+ Score: 53  59-90 High Risk  29-58 Medium Risk  0-28   Low Risk.    TCM Follow-Up Recommendation:  LACE 29-58: Moderate Risk of readmission after discharge from the hospital.      Problem List: Problem List[1]      Physical Exam:   General appearance: alert, appears stated age and cooperative  Pulmonary:  clear to auscultation bilaterally  Cardiovascular: S1, S2 normal, no murmur, click, rub or gallop, regular rate and rhythm  Abdominal: soft, non-tender; bowel sounds normal; no masses,  no organomegaly  Extremities: extremities normal, atraumatic, no cyanosis or edema  Psychiatric: calm      HPI per admitting : Patient is a 44-year-old male with past medical history of asthma and hyperlipidemia, who had come to the hospital endorsing several days of exertional chest pain and shortness of breath, which also radiates to his  left arm and left jaw. Patient does state that he does have a family history of heart disease and elevated cholesterol. Denies any fevers or chills, headaches, blurred vision, palpitations, nausea, vomiting, diarrhea, headaches, or recent travel.     Hospital Course: Patient is a 44-year-old male with past medical history of hypercholesterolemia and family history of heart disease, who had come to the hospital endorsing several days of exertional chest pressure and chest tightness.    1.       Exertional chest pain with left-sided radiation to the left arm and jaw.    Patient's troponins have been negative.    EKGs have been nonacute.    CTA of the chest has been negative as well as the chest x-ray.   Cards consulted-Appreciate recs   Per cards: plan for inpatient CT coronary angiogram tomorrow. Will start oral protocol tonight with morning protocol to follow. If abnormalities are detected, will proceed with invasive coronary angiogram.   ECHO-EF 60-65%  Pt cleared from Cards for discharge  Recommended Statin.       2.       Patient does have a history of depression.    We will continue patient's home medications.    Consultations: Cardiology     Procedures:     ECHO: Conclusions:     1. Left ventricle: The cavity size was normal. Wall thickness was normal.      Systolic function was normal. The estimated ejection fraction was 60-65%,      by single plane method of disks. No diagnostic evidence for regional wall      motion abnormalities. Left ventricular diastolic function parameters were      normal.   2. Left atrium: The left atrial volume was normal.   3. Ascending aorta: The ascending aorta was at the upper limits of normal      and 3.8cm diameter.   Impressions:  No previous study from [facility] was   available for comparison.       CTA gated Coronaries  CORONARY ARTERIES:  Dominance Left.  Origin with no anomalies.  Left main no stenosis or plaque.  Left anterior descending artery is normal  size. There are 2 major diagonal branches.    Left anterior descending artery demonstrates no stenosis or plaque. Diagonal branches demonstrate no stenosis or plaque.    Circumflex is normal size. There are 3 major marginal branches.    Circumflex demonstrates no stenosis or plaque. Marginal branches demonstrate no stenosis or plaque.  Right coronary artery is normal size. Right coronary artery demonstrates no stenosis or plaque.  PDA demonstrates no stenosis or plaque.  Posterolateral branches demonstrate no stenosis or plaque.       Complications: see hospital course     Discharge Condition: Good    Discharge Medications:      Discharge Medications        START taking these medications        Instructions Prescription details   atorvastatin 40 MG Tabs  Commonly known as: Lipitor      Take 1 tablet (40 mg total) by mouth nightly.   Quantity: 30 tablet  Refills: 0            CHANGE how you take these medications        Instructions Prescription details   gabapentin 600 MG Tabs  Commonly known as: Neurontin  What changed: when to take this      Take 1 tablet (600 mg total) by mouth daily.   Quantity: 90 tablet  Refills: 3            CONTINUE taking these medications        Instructions Prescription details   DULoxetine 60 MG Cpep  Commonly known as: Cymbalta      Take 1 capsule (60 mg total) by mouth daily.   Quantity: 90 capsule  Refills: 3     DULoxetine 30 MG Cpep  Commonly known as: Cymbalta      Take 1 capsule (30 mg total) by mouth daily.   Quantity: 90 capsule  Refills: 3               Where to Get Your Medications        These medications were sent to [facility]/pharmacy #[id number] - [place], [place] - [address] AT [address] [facility] [address], [phone], [phone] [address] [address]      Phone: [phone]   atorvastatin 40 MG Tabs         Follow up Visits: Follow-up with PCP and Cards in 1 week        Tiffanie Villar MD  6/2/2025  1:01 PM    > 35 min        [1]   Patient Active Problem  List  Diagnosis    Chronic thoracic back pain    Fatigue    Low testosterone in male    Allergic bronchitis (HCC)    Laboratory exam ordered as part of routine general medical examination    Hypoglycemia    Chest pain of uncertain etiology       Electronically signed by Tiffanie Villar MD on 6/2/2025  1:06 PM